# Patient Record
Sex: FEMALE | Race: WHITE
[De-identification: names, ages, dates, MRNs, and addresses within clinical notes are randomized per-mention and may not be internally consistent; named-entity substitution may affect disease eponyms.]

---

## 2017-10-09 NOTE — PT INITIAL EVALUATION
MEDICAL DIAGNOSIS: gait and balance disturbance and low back pain

TREATMENT DIAGNOSIS: same, positional vertigo

DATE OF ONSET: 01/01/17



SUBJECTIVE: Veena Vines presents to physical therapy with complaints of 
dizziness  with positional changes, dragging feet with ambulation, decreased 
energy,  not feeling steady on her feet, and low back pain. She reports that 
she  was given a medication for her back and states that it is feeling better,  
however, it disturbed her bowels and was placed on another medication for  her 
bowels, which has been helping too. She states that she has dizziness  with 
changes positions and states that it feels like the room is spinning.  
Furthermore, she reports that she wore a heart monitor for 2 days and will  
find the results out today or tomorrow. She denies any falls. She states  that 
she has come close but continues to bounce back and forth off the  walls. 



REHAB PROBLEM LIST: Increased Pain

Decreased ROM

Decreased Strength

Decreased Endurance

Decreased Balance

Decreased Function

Decreased ADL's

Decreased Mobility

Decreased Gait





PREVIOUS MEDICAL HISTORY: See EMR

OCCUPATION: Retired



OBJECTIVE: 



Posture: She demonstrated B rounded shoulders, increased thoracic kyphosis,  
decreased lumbar lordosis.



ROM: Trunk AROM: flexion, extension, R/L sidebending: WFL's, B hip flexion,  
extension, abduction, adduction, B knee flexion extension, B ankle PF and  DF: 
WFL's. 



Strength: Will test strength next session as we did not have enough time since 
we  focused most of our time on the vestibular examination and treatment



Special Tests: (+) prince dwyer pike R/L. (-) horizontal canal. (-) VOR, tracking 
in an H  formation: no deviations...will test balance, low back pain: next 
session  since we focused on resolving the vertigo



Mobility: Modified independent



Gait: She demonstrated the following gait mechanics: decreased velocity,  
decreased hip and knee flexion, decreased B foot clearance, increased  
deviation to the R/L, decreased B pelvic rotation, increased double limb  
support, and decreased swing phases of gait. 



Balance: Will test balance next session as we did not have enough time since we
  focused most of our time on the vestibular examination and treatment



Other Objective Findings: BP: 142/80, SPO2%: 93%, HR: 76 BPM



ASSESSMENT: Veena will benefit from skilled physical therapy addressing the 
listed  impairments to improve function, return to prior level of function, and
  improve QOL. 



Short Term Goals

2 weeks: Pt will be able to function with 0/10 dizziness (related to  vertigo) 
with positional changes to improve function and QOL. 

4 weeks: Pt will demonstrate centralized low back symptoms to improve  function 
and QOL. 

6 week: Pt will demonstrate abolished low back symptoms (0/10) to improve  
function and QOL. 

8 weeks: Pt will be able to improve balance strategies, gait mechanics  (
decreased shuffling of feet), and improve overall endurance to return to  prior 
level of function. 



Patient's Goals

improve energy, steadiness on her feet, and get rid of dizziness



PLAN:  Patient to be seen for Manual Therapy/STM/MET

Strengthening/condition

Ice/Heat

Range of Motion

Spinal Stabilization

Work Hardening/Cond

Stretching

Neuromuscular Re-ed

Closed Chain Program

Electrical Stim

Posture/Body mechanics

Gait Trg/Balance Trg

Home Exercise Program

Therapeutic Activities



2-3x/week for 2 Months



If you have any questions, comments, or concerns about this report or plan, 
please contact me at [phone].



Thank  you,



Judson Nunez, PT, DPT 
[facility]

## 2017-10-17 ENCOUNTER — HOSPITAL ENCOUNTER (OUTPATIENT)
Dept: HOSPITAL 89 - PT | Age: 82
LOS: 82 days | End: 2018-01-07
Attending: NURSE PRACTITIONER
Payer: MEDICARE

## 2017-10-17 DIAGNOSIS — R26.89: ICD-10-CM

## 2017-10-17 DIAGNOSIS — H81.10: ICD-10-CM

## 2017-10-17 DIAGNOSIS — M54.5: Primary | ICD-10-CM

## 2017-10-17 PROCEDURE — 97162 PT EVAL MOD COMPLEX 30 MIN: CPT

## 2017-10-25 NOTE — PT PLAN OF CARE
Physician: GENI Lees

Patient is being seen: 1-2x/week         Therapist: Judson Nunez, PT, DPT 

Medical Diagnosis: gait and balance disturbance and low back pain

Treatment Diagnosis: same, positional vertigo

Date of Onset: 01/01/17         Date of Initial Evaluation: 10/09/17

Date patient was last seen: 10/17/17

Number of treatments: 3         Number of cancellations/No shows: 0



INTERVENTIONS:

Manual Therapy/STM/MET

Strengthening/condition

Ice/Heat

Range of Motion

Spinal Stabilization

Work Hardening/Cond

Stretching

Neuromuscular Re-ed

Closed Chain Program

Electrical Stim

Posture/Body mechanics

Gait Trg/Balance Trg

Home Exercise Program

Therapeutic Activities





GOALS:

2 weeks: Pt will be able to function with 0/10 dizziness (related to  vertigo) 
with positional changes to improve function and QOL. MET 

4 weeks: Pt will demonstrate centralized low back symptoms to improve  function 
and QOL. Progressing

6 week: Pt will demonstrate abolished low back symptoms (0/10) to improve  
function and QOL. Progressing

8 weeks: Pt will be able to improve balance strategies, gait mechanics  (
decreased shuffling of feet), and improve overall endurance to return to  prior 
level of function. Progressed 



PATIENT'S GOAL:

improve energy, steadiness on her feet, and get rid of dizziness



Status of Patient's Goals: Progressed in PT 



Patient Compliance:  Good         Prognosis: Good



Reasons for discontinuing therapy: This is a discharge note for Veena Vines. She was progressing well within PT with abolished vertigo symptoms and 
we were starting to address her low back pain and she was able to demonstrate 
centralized low back symptoms by the end of our last session. She reports that 
she developed a ruptured ear drum and has to be discharged in order to resolve 
the current medical condition prior to proceeding with PT. 



Posture: She demonstrated B rounded shoulders, increased thoracic kyphosis,  
decreased lumbar lordosis.

ROM: Trunk AROM: flexion, extension, R/L sidebending: WFL's, B hip flexion,  
extension, abduction, adduction, B knee flexion extension, B ankle PF and  DF: 
WFL's. 

Strength: Will test strength next session as we did not have enough time since 
we  focused most of our time on the vestibular examination and treatment

Palpation: 

Special Tests: (-) prince [place]ike R/L. (-) horizontal canal. (-) VOR, tracking 
in an H  formation: no deviations

Mobility: Modified independent



If you have any questions, please contact me at . 



Thank you,



Judson Nunez PT, DPT 
[facility]

## 2019-02-05 ENCOUNTER — HOSPITAL ENCOUNTER (OUTPATIENT)
Dept: HOSPITAL 89 - MRI | Age: 84
End: 2019-02-05
Attending: NURSE PRACTITIONER
Payer: MEDICARE

## 2019-02-05 DIAGNOSIS — M47.896: Primary | ICD-10-CM

## 2019-02-05 PROCEDURE — 72148 MRI LUMBAR SPINE W/O DYE: CPT

## 2019-02-05 NOTE — RADIOLOGY IMAGING REPORT
FACILITY: [facility] [place] 

 

PATIENT NAME: Veena Vines

: 1932

MR: [id number]

V: [id number]

EXAM DATE: 

ORDERING PHYSICIAN: FARA PETERS

TECHNOLOGIST: 

 

Location: 

Patient: Veena Vines

: 1932

MRN: [id number]

Visit/Account:[id number]

Date of Sevice:  2019

 

ACCESSION #: [id number]

 

Study: MRI lumbar spine without gadolinium contrast.

 

Indication:Low back pain

 

Comparison study:None

 

Technique:Multiplanar MRI sequences were obtained through the lumbar spine without the use of gadolin
ium contrast.

 

Findings:The examination demonstrates the presence of normal alignment of the lumbar vertebrae.  Ther
e is a levoscoliosis of the lumbar spine noted.  There is no abnormal signal identified within the oscar
mbar or sacral vertebrae.

 

The conus medullaris is located posterior to the T12/L1 disc space level.

 

There is no evidence of abnormality of the lumbar nerve roots.

 

Disc spaces:

 

L1/2: At this level, there is a diffuse disc bulge.  There is a superimposed right lateral disc protr
usion.  There is severe right and mild left neural foraminal stenosis.  There is no significant spina
l stenosis.

 

L2/3: At this level, there is a diffuse disc bulge with a superimposed right lateral disc protrusion.
  There is moderate to severe right and no significant left neural foraminal stenosis.  There is no s
ignificant spinal stenosis.

 

L3/4: At this level, there is a mild diffuse disc bulge.  There is no significant spinal stenosis.  T
here is mild right and no significant left neural foraminal stenosis.

 

L4/5: At this level, there is a diffuse disc bulge.  There is facet and ligamentous hypertrophy.  The
re is moderate spinal stenosis.  There is mild to moderate right and moderate to severe left neural f
oraminal stenosis.

 

L5/S1: At this level, there is a diffuse disc bulge.  There is facet and ligamentous hypertrophy.  Th
ere is no significant spinal stenosis.  There is mild right and moderate left neural foraminal stenos
is.

 

 

 

IMPRESSION:Multilevel lumbar disc pathology and spondylopathy present as described.

 

There is moderate spinal stenosis present at the L4-5 level.

 

Please see the body of the report for description of individual disc levels.

 

Report Dictated By: Enoch Rosas at 2019 1:24 PM

 

Report E-Signed By: Enoch Rosas  at 2019 1:27 PM

 

WSN:[id number]